# Patient Record
Sex: MALE | Race: WHITE | NOT HISPANIC OR LATINO | Employment: FULL TIME | URBAN - METROPOLITAN AREA
[De-identification: names, ages, dates, MRNs, and addresses within clinical notes are randomized per-mention and may not be internally consistent; named-entity substitution may affect disease eponyms.]

---

## 2022-02-23 ENCOUNTER — HOSPITAL ENCOUNTER (OUTPATIENT)
Dept: RADIOLOGY | Facility: HOSPITAL | Age: 37
Discharge: HOME | End: 2022-02-23
Attending: ORTHOPAEDIC SURGERY
Payer: COMMERCIAL

## 2022-02-23 ENCOUNTER — OFFICE VISIT (OUTPATIENT)
Dept: ORTHOPEDICS | Facility: CLINIC | Age: 37
End: 2022-02-23
Payer: COMMERCIAL

## 2022-02-23 VITALS — BODY MASS INDEX: 31.07 KG/M2 | HEIGHT: 70 IN | WEIGHT: 217 LBS

## 2022-02-23 DIAGNOSIS — S86.112A GASTROCNEMIUS MUSCLE RUPTURE, LEFT, INITIAL ENCOUNTER: Primary | ICD-10-CM

## 2022-02-23 DIAGNOSIS — M79.662 PAIN OF LEFT CALF: Primary | ICD-10-CM

## 2022-02-23 DIAGNOSIS — M79.662 PAIN OF LEFT CALF: ICD-10-CM

## 2022-02-23 PROCEDURE — 1159F MED LIST DOCD IN RCRD: CPT | Mod: CPTII,S$GLB,, | Performed by: ORTHOPAEDIC SURGERY

## 2022-02-23 PROCEDURE — 3008F BODY MASS INDEX DOCD: CPT | Mod: CPTII,S$GLB,, | Performed by: ORTHOPAEDIC SURGERY

## 2022-02-23 PROCEDURE — 73590 X-RAY EXAM OF LOWER LEG: CPT | Mod: TC,LT

## 2022-02-23 PROCEDURE — 1159F PR MEDICATION LIST DOCUMENTED IN MEDICAL RECORD: ICD-10-PCS | Mod: CPTII,S$GLB,, | Performed by: ORTHOPAEDIC SURGERY

## 2022-02-23 PROCEDURE — 99999 PR PBB SHADOW E&M-NEW PATIENT-LVL II: CPT | Mod: PBBFAC,,, | Performed by: ORTHOPAEDIC SURGERY

## 2022-02-23 PROCEDURE — 3008F PR BODY MASS INDEX (BMI) DOCUMENTED: ICD-10-PCS | Mod: CPTII,S$GLB,, | Performed by: ORTHOPAEDIC SURGERY

## 2022-02-23 PROCEDURE — 99203 OFFICE O/P NEW LOW 30 MIN: CPT | Mod: S$GLB,,, | Performed by: ORTHOPAEDIC SURGERY

## 2022-02-23 PROCEDURE — 73590 XR TIBIA FIBULA 2 VIEW LEFT: ICD-10-PCS | Mod: 26,LT,, | Performed by: RADIOLOGY

## 2022-02-23 PROCEDURE — 99999 PR PBB SHADOW E&M-NEW PATIENT-LVL II: ICD-10-PCS | Mod: PBBFAC,,, | Performed by: ORTHOPAEDIC SURGERY

## 2022-02-23 PROCEDURE — 73590 X-RAY EXAM OF LOWER LEG: CPT | Mod: 26,LT,, | Performed by: RADIOLOGY

## 2022-02-23 PROCEDURE — 99203 PR OFFICE/OUTPT VISIT, NEW, LEVL III, 30-44 MIN: ICD-10-PCS | Mod: S$GLB,,, | Performed by: ORTHOPAEDIC SURGERY

## 2022-02-23 NOTE — PATIENT INSTRUCTIONS
Assessment:  Jerry Azar is a  36 y.o. male    at [facility] with a chief complaint of Injury of the Left Lower Leg    Left gastroc strain at myotendinous jounction    Encounter Diagnosis   Name Primary?    Gastrocnemius muscle rupture, left, initial encounter Yes      Plan:  Boot, wear when mobilizing  Crutches until he can mobilize in the boot pain free  PT with Hans @ [facility]  May consider MRI if no improvement in 2 weeks    Follow-up: 2 weeks or sooner if there are any problems between now and then.    Thank you for choosing [facility] and Dr. Tony Nicolas for your orthopedic & sports medicine care. It is our goal to provide you with exceptional care that will help keep you healthy, active, and get you back in the game.    If you felt that you received exemplary care today, please consider leaving us feedback on [employer] at [email or web address].     Please do not hesitate to reach out to us via email, phone, or MyChart with any questions, concerns, or feedback.    If you are experiencing pain/discomfort ,or have questions after 5pm and would like to be connected to the [facility] on-call team, please call this number and specify which Sports Medicine provider is treating you: [phone]

## 2022-03-10 ENCOUNTER — PATIENT MESSAGE (OUTPATIENT)
Dept: ORTHOPEDICS | Facility: CLINIC | Age: 37
End: 2022-03-10
Payer: COMMERCIAL

## 2023-10-30 NOTE — PROGRESS NOTES
Patient ID: Jerry Azar  YOB: 1985  MRN: [id number]    Chief Complaint: Injury of the Left Lower Leg    Referred By: MARGO DONNELLY    History of Present Illness: Jerry Azar is a 36 y.o. male   [facility] ([place])  with onset of left calf pain after sprinting with an athlete and feeling a pop  Symptom Onset:  His pain began on 2/21/2022.  There was a specific injury.  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 After a few steps, he felt a pop in his Left Calf, and caught himself before falling.  Prior Treatment:   He has previously been treated by [facility] at [facility] on 2/22/2022.  Treatment included manual exam and referred to ortho.   Current Symptoms: His pain is currently located on his Lower Left Calf.  Average level of pain is 7/10.  Pain is unchanged.  He has experienced the following symptoms: pain and instability.  Aggravating activities include running and walking.    HPI    Past Medical History:   History reviewed. No pertinent past medical history.  Past Surgical History:   Procedure Laterality Date    gastric sleeve  2018     History reviewed. No pertinent family history.  Social History     Socioeconomic History    Marital status:    Tobacco Use    Smoking status: Never Smoker    Smokeless tobacco: Never Used     Review of patient's allergies indicates:   Allergen Reactions    Iodine and iodide containing products Swelling       Physical Exam:   Body mass index is 31.14 kg/m².  GENERAL: Well appearing, appropriate for stated age, no acute distress.  CARDIOVASCULAR: Pulses regular by peripheral palpation.  PULMONARY: Respirations are even and non-labored.  NEURO: Awake, alert, and oriented x 3.  PSYCH: Mood & affect are appropriate.  HEENT: Head is normocephalic and atraumatic.    Ortho/SPM Exam  Left Knee:  Inspection:   Range of motion within normal limits and painless. Intact motor and sensation. Good perfusion. No tenderness, gross deformity, gross  - /70 today  - Continue with Entresto, Metoprolol XL, and Aldactone  - Holding Torsemide due to elevated Cr   instability, or skin lesions.    Left calf:  Achilles intact  No visible swellign or deformity  Comp soft and compressible  Pain with passive dorsiflexion, worse with knee in extension  Neurovascular: Intact EHL, FHL, gastrocsoleus, and tibialis anterior. Sensation intact to light touch in superficial peroneal, deep peroneal, tibial, sural, and saphenous nerve distributions. Foot warm and well perfused with capillary refill of less than 2 seconds and palpable pedal pulses.     Imaging:   XR Results:    X-Ray Tibia Fibula 2 View Left  Narrative: EXAMINATION:  XR TIBIA FIBULA 2 VIEW LEFT    CLINICAL HISTORY:  Pain in left lower leg    TECHNIQUE:  AP and lateral views of the left tibia and fibula were performed.    COMPARISON:  None.    FINDINGS:  No acute osseous or soft tissue abnormality.  Impression: As above    Electronically signed by: Abhishek Garces MD  Date:    02/23/2022  Time:    11:07        MRI Results:  No results found for this or any previous visit.    CT Results:  No results found for this or any previous visit.    Relevant imaging results reviewed and interpreted by me, discussed with the patient and / or family today.     Other Tests:   No other tests performed today.    Patient Instructions     Assessment:  Jerry Azar is a  36 y.o. male    at [facility] with a chief complaint of Injury of the Left Lower Leg     Left gastroc strain at myotendinous jounction    Encounter Diagnosis   Name Primary?    Gastrocnemius muscle rupture, left, initial encounter Yes      Plan:   Boot, wear when mobilizing   Crutches until he can mobilize in the boot pain free   PT with Adam @ [facility]   May consider MRI if no improvement in 2 weeks    Follow-up: 2 weeks or sooner if there are any problems between now and then.    Thank you for choosing [facility] and Dr. Tony Nicolas for your orthopedic & sports medicine care. It is our goal to provide you with  exceptional care that will help keep you healthy, active, and get you back in the game.    If you felt that you received exemplary care today, please consider leaving us feedback on [employer] at [email or web address].     Please do not hesitate to reach out to us via email, phone, or MyChart with any questions, concerns, or feedback.    If you are experiencing pain/discomfort ,or have questions after 5pm and would like to be connected to the [facility] on-call team, please call this number and specify which Sports Medicine provider is treating you: [phone]         Provider Note/Medical Decision Making:      I discussed worrisome and red flag signs and symptoms with the patient. The patient expressed understanding and agreed to alert me immediately or to go to the emergency room if they experience any of these.    Treatment plan was developed with input from the patient/family, and they expressed understanding and agreement with the plan. All questions were answered today.    Disclaimer: This note was prepared using a voice recognition system and is likely to have sound alike errors within the text.